# Patient Record
Sex: FEMALE | Race: WHITE | NOT HISPANIC OR LATINO | URBAN - METROPOLITAN AREA
[De-identification: names, ages, dates, MRNs, and addresses within clinical notes are randomized per-mention and may not be internally consistent; named-entity substitution may affect disease eponyms.]

---

## 2021-02-11 ENCOUNTER — IMMUNIZATION (OUTPATIENT)
Dept: FAMILY MEDICINE | Facility: CLINIC | Age: 66
End: 2021-02-11
Payer: COMMERCIAL

## 2021-02-11 DIAGNOSIS — Z23 NEED FOR VACCINATION: Primary | ICD-10-CM

## 2021-02-11 PROCEDURE — 91300 COVID-19, MRNA, LNP-S, PF, 30 MCG/0.3 ML DOSE VACCINE: CPT | Mod: PBBFAC | Performed by: FAMILY MEDICINE

## 2021-03-04 ENCOUNTER — IMMUNIZATION (OUTPATIENT)
Dept: FAMILY MEDICINE | Facility: CLINIC | Age: 66
End: 2021-03-04
Payer: COMMERCIAL

## 2021-03-04 DIAGNOSIS — Z23 NEED FOR VACCINATION: Primary | ICD-10-CM

## 2021-03-04 PROCEDURE — 91300 COVID-19, MRNA, LNP-S, PF, 30 MCG/0.3 ML DOSE VACCINE: CPT | Mod: PBBFAC | Performed by: FAMILY MEDICINE

## 2021-03-04 PROCEDURE — 0002A COVID-19, MRNA, LNP-S, PF, 30 MCG/0.3 ML DOSE VACCINE: CPT | Mod: PBBFAC | Performed by: FAMILY MEDICINE

## 2023-10-17 PROBLEM — E61.1 IRON DEFICIENCY: Status: ACTIVE | Noted: 2023-10-17

## 2024-06-13 ENCOUNTER — TELEPHONE (OUTPATIENT)
Dept: INFUSION THERAPY | Facility: HOSPITAL | Age: 69
End: 2024-06-13
Payer: MEDICARE

## 2024-06-13 NOTE — TELEPHONE ENCOUNTER
----- Message from Bette Powers sent at 6/12/2024  4:17 PM CDT -----  Type: Appointment Request    Caller is requesting appointment.      Name of Caller:pt    When is the first available appointment?na    Symptoms:infusion    Would the patient rather a call back or a response via [facility]? Call back    Best Call Back Number:[phone]      Additional Information: [facility] office should have sent over a fax for new orders     Please call Back to advise. Thanks!

## 2024-06-17 ENCOUNTER — TELEPHONE (OUTPATIENT)
Dept: HEMATOLOGY/ONCOLOGY | Facility: CLINIC | Age: 69
End: 2024-06-17
Payer: MEDICARE

## 2024-06-17 NOTE — TELEPHONE ENCOUNTER
----- Message from Nikole Doherty sent at 6/17/2024  2:02 PM CDT -----  Contact: GWENDOLYN Harris  Type:  Sooner Appointment Request    Name of Caller: Anette toth/[facility], phone: [phone]   When is the first available appointment? N/A  Symptoms: Ferrlecit q 2 days x6(2) per Referral in the system  Would the patient rather a call back or a response via [facility]?  call  Best Call Back Number: [phone]  Anette is calling to check the status of the referral  Please call pt to advise/schedule... Thank you...

## 2024-07-01 ENCOUNTER — TELEPHONE (OUTPATIENT)
Dept: SURGERY | Facility: CLINIC | Age: 69
End: 2024-07-01
Payer: MEDICARE

## 2024-07-01 NOTE — TELEPHONE ENCOUNTER
Pt wanted to know if Dr Lovell accepted her insurance. She said she talked to someone and was told he did but is not sure if anything he orders will be covered. Gave her a # to the financial dept to see if they can help her.

## 2024-07-01 NOTE — TELEPHONE ENCOUNTER
----- Message from Brynn Nash sent at 7/1/2024  9:37 AM CDT -----  Type: Needs Medical Advice  Who Called:  Patient   Symptoms (please be specific):    How long has patient had these symptoms:    Pharmacy name and phone #:    Best Call Back Number: [phone]  Additional Information: Patient is requesting a call back from the nurse.

## 2025-04-11 PROBLEM — R15.9 FECAL INCONTINENCE: Status: ACTIVE | Noted: 2025-04-11

## 2025-04-11 PROBLEM — N81.89 PELVIC FLOOR WEAKNESS IN FEMALE: Status: ACTIVE | Noted: 2025-04-11

## 2025-07-18 ENCOUNTER — TELEPHONE (OUTPATIENT)
Dept: RESEARCH | Facility: HOSPITAL | Age: 70
End: 2025-07-18

## 2025-07-18 NOTE — TELEPHONE ENCOUNTER
.Study title: GEORGES LIZAMA  IRB #: [id number]      Patient's spouse called back to discuss scheduling for REACH screening visit for both parties.     Patient voiced understanding and expressed interest in a screening visit at [facility] on 8/1 @ 10a, along with spouse. CRC sent copy of consent form in [email or web address] for review at convenience.